# Patient Record
Sex: MALE | URBAN - METROPOLITAN AREA
[De-identification: names, ages, dates, MRNs, and addresses within clinical notes are randomized per-mention and may not be internally consistent; named-entity substitution may affect disease eponyms.]

---

## 2017-10-30 ENCOUNTER — TRANSFERRED RECORDS (OUTPATIENT)
Dept: HEALTH INFORMATION MANAGEMENT | Facility: CLINIC | Age: 57
End: 2017-10-30

## 2017-10-31 ENCOUNTER — HOSPITAL ENCOUNTER (OUTPATIENT)
Facility: CLINIC | Age: 57
Setting detail: OBSERVATION
Discharge: HOME | End: 2017-11-01
Attending: PHYSICIAN ASSISTANT | Admitting: INTERNAL MEDICINE
Payer: COMMERCIAL

## 2017-10-31 DIAGNOSIS — K80.00 CALCULUS OF GALLBLADDER WITH ACUTE CHOLECYSTITIS WITHOUT OBSTRUCTION: ICD-10-CM

## 2017-10-31 DIAGNOSIS — E87.6 HYPOKALEMIA: ICD-10-CM

## 2017-10-31 DIAGNOSIS — R03.0 ELEVATED BLOOD PRESSURE READING: ICD-10-CM

## 2017-10-31 DIAGNOSIS — R94.31 PROLONGED Q-T INTERVAL ON ECG: ICD-10-CM

## 2017-10-31 PROBLEM — K80.20 GALL STONE: Status: ACTIVE | Noted: 2017-10-31

## 2017-10-31 LAB
ALBUMIN SERPL-MCNC: 3.5 G/DL (ref 3.4–5)
ALP SERPL-CCNC: 63 U/L (ref 40–150)
ALT SERPL W P-5'-P-CCNC: 67 U/L (ref 0–70)
ANION GAP SERPL CALCULATED.3IONS-SCNC: 7 MMOL/L (ref 3–14)
AST SERPL W P-5'-P-CCNC: 62 U/L (ref 0–45)
BASOPHILS # BLD AUTO: 0 10E9/L (ref 0–0.2)
BASOPHILS NFR BLD AUTO: 0.5 %
BILIRUB SERPL-MCNC: 0.7 MG/DL (ref 0.2–1.3)
BUN SERPL-MCNC: 6 MG/DL (ref 7–30)
CALCIUM SERPL-MCNC: 9 MG/DL (ref 8.5–10.1)
CHLORIDE SERPL-SCNC: 99 MMOL/L (ref 94–109)
CO2 SERPL-SCNC: 33 MMOL/L (ref 20–32)
CREAT SERPL-MCNC: 1.13 MG/DL (ref 0.66–1.25)
DIFFERENTIAL METHOD BLD: ABNORMAL
EOSINOPHIL # BLD AUTO: 0 10E9/L (ref 0–0.7)
EOSINOPHIL NFR BLD AUTO: 0.5 %
ERYTHROCYTE [DISTWIDTH] IN BLOOD BY AUTOMATED COUNT: 12.8 % (ref 10–15)
GFR SERPL CREATININE-BSD FRML MDRD: 67 ML/MIN/1.7M2
GLUCOSE SERPL-MCNC: 99 MG/DL (ref 70–99)
HCT VFR BLD AUTO: 39.5 % (ref 40–53)
HGB BLD-MCNC: 13.7 G/DL (ref 13.3–17.7)
IMM GRANULOCYTES # BLD: 0 10E9/L (ref 0–0.4)
IMM GRANULOCYTES NFR BLD: 0.2 %
INR PPP: 1.06 (ref 0.86–1.14)
INTERPRETATION ECG - MUSE: NORMAL
LIPASE SERPL-CCNC: 305 U/L (ref 73–393)
LYMPHOCYTES # BLD AUTO: 1.7 10E9/L (ref 0.8–5.3)
LYMPHOCYTES NFR BLD AUTO: 19.6 %
MAGNESIUM SERPL-MCNC: 1.9 MG/DL (ref 1.6–2.3)
MCH RBC QN AUTO: 29.7 PG (ref 26.5–33)
MCHC RBC AUTO-ENTMCNC: 34.7 G/DL (ref 31.5–36.5)
MCV RBC AUTO: 86 FL (ref 78–100)
MONOCYTES # BLD AUTO: 1.2 10E9/L (ref 0–1.3)
MONOCYTES NFR BLD AUTO: 13.6 %
NEUTROPHILS # BLD AUTO: 5.6 10E9/L (ref 1.6–8.3)
NEUTROPHILS NFR BLD AUTO: 65.6 %
NRBC # BLD AUTO: 0 10*3/UL
NRBC BLD AUTO-RTO: 0 /100
PLATELET # BLD AUTO: 172 10E9/L (ref 150–450)
POTASSIUM SERPL-SCNC: 3 MMOL/L (ref 3.4–5.3)
POTASSIUM SERPL-SCNC: 3.1 MMOL/L (ref 3.4–5.3)
PROT SERPL-MCNC: 7.2 G/DL (ref 6.8–8.8)
RBC # BLD AUTO: 4.62 10E12/L (ref 4.4–5.9)
SODIUM SERPL-SCNC: 139 MMOL/L (ref 133–144)
TROPONIN I SERPL-MCNC: <0.015 UG/L (ref 0–0.04)
WBC # BLD AUTO: 8.5 10E9/L (ref 4–11)

## 2017-10-31 PROCEDURE — 93005 ELECTROCARDIOGRAM TRACING: CPT

## 2017-10-31 PROCEDURE — 83735 ASSAY OF MAGNESIUM: CPT | Performed by: PHYSICIAN ASSISTANT

## 2017-10-31 PROCEDURE — 36415 COLL VENOUS BLD VENIPUNCTURE: CPT | Performed by: INTERNAL MEDICINE

## 2017-10-31 PROCEDURE — 96365 THER/PROPH/DIAG IV INF INIT: CPT

## 2017-10-31 PROCEDURE — 25800025 ZZH RX 258: Performed by: INTERNAL MEDICINE

## 2017-10-31 PROCEDURE — 84132 ASSAY OF SERUM POTASSIUM: CPT | Performed by: INTERNAL MEDICINE

## 2017-10-31 PROCEDURE — 85025 COMPLETE CBC W/AUTO DIFF WBC: CPT | Performed by: PHYSICIAN ASSISTANT

## 2017-10-31 PROCEDURE — 80053 COMPREHEN METABOLIC PANEL: CPT | Performed by: PHYSICIAN ASSISTANT

## 2017-10-31 PROCEDURE — 83690 ASSAY OF LIPASE: CPT | Performed by: PHYSICIAN ASSISTANT

## 2017-10-31 PROCEDURE — 25000128 H RX IP 250 OP 636: Performed by: PHYSICIAN ASSISTANT

## 2017-10-31 PROCEDURE — 84484 ASSAY OF TROPONIN QUANT: CPT | Performed by: PHYSICIAN ASSISTANT

## 2017-10-31 PROCEDURE — 99207 ZZC CDG-MDM COMPONENT: MEETS MODERATE - UP CODED: CPT | Performed by: INTERNAL MEDICINE

## 2017-10-31 PROCEDURE — 25000132 ZZH RX MED GY IP 250 OP 250 PS 637: Performed by: INTERNAL MEDICINE

## 2017-10-31 PROCEDURE — G0378 HOSPITAL OBSERVATION PER HR: HCPCS

## 2017-10-31 PROCEDURE — 96366 THER/PROPH/DIAG IV INF ADDON: CPT

## 2017-10-31 PROCEDURE — 99285 EMERGENCY DEPT VISIT HI MDM: CPT | Mod: 25

## 2017-10-31 PROCEDURE — 85610 PROTHROMBIN TIME: CPT | Performed by: PHYSICIAN ASSISTANT

## 2017-10-31 PROCEDURE — 96367 TX/PROPH/DG ADDL SEQ IV INF: CPT

## 2017-10-31 PROCEDURE — 99220 ZZC INITIAL OBSERVATION CARE,LEVL III: CPT | Performed by: INTERNAL MEDICINE

## 2017-10-31 RX ORDER — POTASSIUM CHLORIDE 1500 MG/1
20-40 TABLET, EXTENDED RELEASE ORAL
Status: DISCONTINUED | OUTPATIENT
Start: 2017-10-31 | End: 2017-11-01 | Stop reason: HOSPADM

## 2017-10-31 RX ORDER — MORPHINE SULFATE 2 MG/ML
2-4 INJECTION, SOLUTION INTRAMUSCULAR; INTRAVENOUS EVERY 4 HOURS PRN
Status: DISCONTINUED | OUTPATIENT
Start: 2017-10-31 | End: 2017-11-01 | Stop reason: HOSPADM

## 2017-10-31 RX ORDER — POTASSIUM CL/LIDO/0.9 % NACL 10MEQ/0.1L
10 INTRAVENOUS SOLUTION, PIGGYBACK (ML) INTRAVENOUS
Status: DISCONTINUED | OUTPATIENT
Start: 2017-10-31 | End: 2017-11-01 | Stop reason: HOSPADM

## 2017-10-31 RX ORDER — POTASSIUM CHLORIDE 29.8 MG/ML
20 INJECTION INTRAVENOUS
Status: DISCONTINUED | OUTPATIENT
Start: 2017-10-31 | End: 2017-11-01 | Stop reason: HOSPADM

## 2017-10-31 RX ORDER — ACETAMINOPHEN 650 MG/1
650 SUPPOSITORY RECTAL EVERY 4 HOURS PRN
Status: DISCONTINUED | OUTPATIENT
Start: 2017-10-31 | End: 2017-11-01 | Stop reason: HOSPADM

## 2017-10-31 RX ORDER — ACETAMINOPHEN 325 MG/1
650 TABLET ORAL EVERY 4 HOURS PRN
Status: DISCONTINUED | OUTPATIENT
Start: 2017-10-31 | End: 2017-11-01 | Stop reason: HOSPADM

## 2017-10-31 RX ORDER — NALOXONE HYDROCHLORIDE 0.4 MG/ML
.1-.4 INJECTION, SOLUTION INTRAMUSCULAR; INTRAVENOUS; SUBCUTANEOUS
Status: DISCONTINUED | OUTPATIENT
Start: 2017-10-31 | End: 2017-11-01 | Stop reason: HOSPADM

## 2017-10-31 RX ORDER — LIDOCAINE 40 MG/G
CREAM TOPICAL
Status: DISCONTINUED | OUTPATIENT
Start: 2017-10-31 | End: 2017-11-01 | Stop reason: HOSPADM

## 2017-10-31 RX ORDER — OXYCODONE HYDROCHLORIDE 5 MG/1
5-10 TABLET ORAL
Status: DISCONTINUED | OUTPATIENT
Start: 2017-10-31 | End: 2017-11-01 | Stop reason: HOSPADM

## 2017-10-31 RX ORDER — NITROGLYCERIN 0.4 MG/1
0.4 TABLET SUBLINGUAL EVERY 5 MIN PRN
Status: DISCONTINUED | OUTPATIENT
Start: 2017-10-31 | End: 2017-11-01 | Stop reason: HOSPADM

## 2017-10-31 RX ORDER — AMOXICILLIN 250 MG
1 CAPSULE ORAL 2 TIMES DAILY PRN
Status: DISCONTINUED | OUTPATIENT
Start: 2017-10-31 | End: 2017-11-01 | Stop reason: HOSPADM

## 2017-10-31 RX ORDER — POTASSIUM CHLORIDE 7.45 MG/ML
10 INJECTION INTRAVENOUS
Status: DISCONTINUED | OUTPATIENT
Start: 2017-10-31 | End: 2017-11-01 | Stop reason: HOSPADM

## 2017-10-31 RX ORDER — POTASSIUM CHLORIDE 1.5 G/1.58G
20-40 POWDER, FOR SOLUTION ORAL
Status: DISCONTINUED | OUTPATIENT
Start: 2017-10-31 | End: 2017-11-01 | Stop reason: HOSPADM

## 2017-10-31 RX ORDER — ONDANSETRON 2 MG/ML
4 INJECTION INTRAMUSCULAR; INTRAVENOUS EVERY 6 HOURS PRN
Status: DISCONTINUED | OUTPATIENT
Start: 2017-10-31 | End: 2017-11-01 | Stop reason: HOSPADM

## 2017-10-31 RX ORDER — POTASSIUM CL/LIDO/0.9 % NACL 10MEQ/0.1L
10 INTRAVENOUS SOLUTION, PIGGYBACK (ML) INTRAVENOUS ONCE
Status: COMPLETED | OUTPATIENT
Start: 2017-10-31 | End: 2017-10-31

## 2017-10-31 RX ORDER — AMOXICILLIN 250 MG
2 CAPSULE ORAL 2 TIMES DAILY PRN
Status: DISCONTINUED | OUTPATIENT
Start: 2017-10-31 | End: 2017-11-01 | Stop reason: HOSPADM

## 2017-10-31 RX ORDER — ONDANSETRON 4 MG/1
4 TABLET, ORALLY DISINTEGRATING ORAL EVERY 6 HOURS PRN
Status: DISCONTINUED | OUTPATIENT
Start: 2017-10-31 | End: 2017-11-01 | Stop reason: HOSPADM

## 2017-10-31 RX ORDER — DEXTROSE MONOHYDRATE, SODIUM CHLORIDE, AND POTASSIUM CHLORIDE 50; 1.49; 9 G/1000ML; G/1000ML; G/1000ML
INJECTION, SOLUTION INTRAVENOUS CONTINUOUS
Status: DISCONTINUED | OUTPATIENT
Start: 2017-10-31 | End: 2017-11-01 | Stop reason: HOSPADM

## 2017-10-31 RX ORDER — BISACODYL 10 MG
10 SUPPOSITORY, RECTAL RECTAL DAILY PRN
Status: DISCONTINUED | OUTPATIENT
Start: 2017-10-31 | End: 2017-11-01 | Stop reason: HOSPADM

## 2017-10-31 RX ADMIN — ACETAMINOPHEN 650 MG: 325 TABLET, FILM COATED ORAL at 19:57

## 2017-10-31 RX ADMIN — POTASSIUM CHLORIDE, DEXTROSE MONOHYDRATE AND SODIUM CHLORIDE: 150; 5; 900 INJECTION, SOLUTION INTRAVENOUS at 16:37

## 2017-10-31 RX ADMIN — TAZOBACTAM SODIUM AND PIPERACILLIN SODIUM 3.38 G: 375; 3 INJECTION, SOLUTION INTRAVENOUS at 14:33

## 2017-10-31 RX ADMIN — SODIUM CHLORIDE 1000 ML: 9 INJECTION, SOLUTION INTRAVENOUS at 14:34

## 2017-10-31 RX ADMIN — Medication 10 MEQ: at 12:47

## 2017-10-31 ASSESSMENT — ENCOUNTER SYMPTOMS
NAUSEA: 1
FEVER: 0
ABDOMINAL PAIN: 1
VOMITING: 1
DIAPHORESIS: 0
CHILLS: 0
DYSURIA: 0
HEMATURIA: 0

## 2017-10-31 ASSESSMENT — PAIN DESCRIPTION - DESCRIPTORS: DESCRIPTORS: CRAMPING

## 2017-10-31 NOTE — PHARMACY-ADMISSION MEDICATION HISTORY
Admission medication history interview status for this patient is complete. See [facility] admission navigator for allergy information, prior to admission medications and immunization status.     Prior to Admission medications    None

## 2017-10-31 NOTE — ED NOTES
[facility]  ED Nurse Handoff Report    Yoel Castnao is a 56 year old male   ED Chief complaint: Abnormal Labs  . ED Diagnosis:   Final diagnoses:   Calculus of gallbladder with acute cholecystitis without obstruction   Hypokalemia   Prolonged Q-T interval on ECG     Allergies: No Known Allergies    Code Status: DNR / DNI  Activity level - Baseline/Home:  Independent. Activity Level - Current:   Independent. Lift room needed: No. Bariatric: No   Needed: No   Isolation: No. Infection: Not Applicable.     Vital Signs:   Vitals:    10/31/17 1130 10/31/17 1345 10/31/17 1400 10/31/17 1415   BP:  (!) 130/97 (!) 135/99 (!) 141/114   Temp:       TempSrc:       SpO2: 99%          Cardiac Rhythm:  ,      Pain level:    Patient confused: No. Patient Falls Risk: Yes.   Elimination Status: Has voided   Patient Report - Initial Complaint:potassium of 2.8 check for an endoscopy scheduled today for a gallbladder concern. The patient notes he has a scheduled endoscopy for intense epigastric pain that began last Sunday which occurred after a self-imposed 3 weeks fast that began on 10/7/17. The patient reports he only drinks water during his fast, and he does the fast once every year. The patient notes he vomited twice a couple of days ago. The patient reports he also lost 40lbs in the last 3 months after changing his diet due to an unhealthy diet before. The patient denies any fevers, chills, diaphoresis, dysuria, or hematuria. Focused Assessment: upper abd tenderness upon palpation. No n/v/d, CP or SOB.   Tests Performed: labs, (US performed yesterday at [facility]) . Abnormal Results: K 3.0, US from yesterday showed inflammed gallbladder.   Treatments provided: Zosyn and K IV, IVF  Family Comments: wife at home   OBS brochure/video discussed/provided to patient:  Yes  ED Medications:   Medications   0.9% sodium chloride BOLUS (1,000 mLs Intravenous New Bag 10/31/17 0414)   piperacillin-tazobactam (ZOSYN)  infusion 3.375 g (3.375 g Intravenous New Bag 10/31/17 1433)   potassium chloride 10 mEq in 100 mL intermittent infusion with 10 mg lidocaine (0 mEq Intravenous Stopped 10/31/17 1348)     Drips infusing:  Yes  For the majority of the shift, the patient's behavior Green. Interventions performed were n/a.     Severe Sepsis OR Septic Shock Diagnosis Present: No      ED Nurse Name/Phone Number: Brijesh Lamar,   2:42 PM    RECEIVING UNIT ED HANDOFF REVIEW    Above ED Nurse Handoff Report was reviewed: Yes  Reviewed by: Yaz Cheema on October 31, 2017 at 2:57 PM

## 2017-10-31 NOTE — ED PROVIDER NOTES
History     Chief Complaint:  Abnormal Labs    HPI   Yoel Castano is a 56 year old male who presents to the ED for evaluation of abnormal labs. The patient reports he is sent to the ED after a potassium of 2.8 check for an endoscopy scheduled today for a gallbladder concern. The patient notes he has a scheduled endoscopy for intense epigastric pain that began last Sunday which occurred after a self-imposed 3 weeks fast that began on 10/7/17. The patient reports he only drinks water during his fast, and he does the fast once every year. The patient notes he vomited twice a couple of days ago. The patient reports he also lost 40lbs in the last 3 months after changing his diet due to an unhealthy diet before. The patient denies any fevers, chills, diaphoresis, dysuria, or hematuria.       US Abdomen Complete, 10/30/2017, [facility]  Findings: gallbladder: abnormal, with luminal distention, wall thickening at the fundus for the degree of distention, and stone and sludge.   Conclusion: Cholelithiasis with sonographic findings fairly suspicious for acute cholecystitis.   Tragn Abdalla MD    Laboratory, 10/30/2017, [facility]  CBC: o/w WNL (WBC 7.7, HGB 14.0, )   CMP: Potassium 2.80(LL), GFR estimate 52(L), Creatinine 1.47(H)    Allergies:  No known drug allergies    Medications:    The patient is currently on no regular medications.    Past Medical History:    The patient does not have any past pertinent medical history.    Past Surgical History:    History reviewed. No pertinent surgical history.    Family History:    History reviewed. No pertinent family history.     Social History:  Smoking status: Former smoker  Alcohol use: Very rare  Presents to ED alone    Review of Systems   Constitutional: Negative for chills, diaphoresis and fever.   Gastrointestinal: Positive for abdominal pain, nausea and vomiting.   Genitourinary: Negative for dysuria and hematuria.   All other systems reviewed  "and are negative.    Physical Exam     Patient Vitals for the past 24 hrs:   BP Temp Temp src Heart Rate Resp SpO2 Height Weight   10/31/17 1520 (!) 137/94 97.7  F (36.5  C) Oral 77 16 98 % 1.803 m (5' 11\") 80.2 kg (176 lb 14.4 oz)   10/31/17 1430 (!) 139/101 - - 90 - - - -   10/31/17 1415 (!) 141/114 - - 77 - - - -   10/31/17 1400 (!) 135/99 - - 79 - - - -   10/31/17 1345 (!) 130/97 - - 79 - - - -   10/31/17 1124 (!) 131/94 97.4  F (36.3  C) Tympanic - - - - -   10/31/17 1122 - - - 97 - 98 % - -     Physical Exam  Nursing note and vitals reviewed.     GENERAL: Alert, mild distress, non toxic appearing.   HEENT: Normal conjunctiva. No scleral icterus. MMM.   NECK: Supple.  CARDIAC: Normal rate and regular rhythm. Normal heart sounds. No murmurs, rubs, or gallops appreciated.  PULMONARY: CTA bilaterally. Normal breath sounds. No wheezing, crackles, or rhonchi appreciated.  ABDOMEN: Reproducible tenderness to light palpation over the right upper quadrant and epigastric regions. Soft, non distended abdomen. RUQ and epigastric tenderness to light palpation. No rebound or guarding.   NEURO: Alert and oriented. Non-focal.   MUSCULOSKELETAL: Normal range of motion. No peripheral edema. No CVA tenderness.   SKIN: Skin is warm and dry. No rashes. No pallor or jaundice.   PSYCH: Normal affect and mood.      Emergency Department Course     ECG (11:25:29):  Rate 85 bpm. RI interval 144. QRS duration 82. QT/QTc 394/468. P-R-T axes 9 4 31. Normal sinus rhythm. Nonspecific T wave abnormality. Prolonged QT. Abnormal ECG. Interpreted at 1127.     Laboratory:  Lipase: 305  Magnesium: 1.9  Troponin I: <0.015  CBC: o/w WNL (WBC 8.5, HGB 13.7, )   CMP: Potassium 3.0(L), Carbon dioxide 33(H), BUN 6(L), AST 62(H), o/w WNL (Creatinine 1.13)    Interventions:  1247: Potassium chloride 10mEq IV  1433: Zosyn 3.375g IV  1434: NS 1L Bolus IV    Emergency Department Course:  Past medical records, nursing notes, and vitals reviewed.  1131: " I performed an exam of the patient and obtained history, as documented above.  IV inserted and blood drawn.    1250: I rechecked the patient. Explained findings to patient.    1308: I spoke with the patient's wife.     1337: I rechecked the patient.    1418: I spoke to Dr. Little of the hospitalist service who accepts the patient for admission.     1422: I rechecked the patient.     Findings and plan explained to the Patient who consents to admission. Discussed the patient with Dr. Little, who will admit the patient to an obs bed for further monitoring, evaluation, and treatment.     Impression & Plan      Medical Decision Making:  This is a 56 year old male who presents with concern for upper abdominal pain in addition to low potassium. Over the past 3 days, he has had upper abdominal discomfort following a 3 week fasting period where he was only drinking water. He was seen at [facility] yesterday and had an ultrasound showing cholelithiasis with signs concerning for possible cholecystitis including gallbladder wall thickening. He also had labs drawn and was noted to have a potassium of 2.8, thus was advised to present to the ED for further evaluation. Here he is afebrile, slightly hypertensive, but otherwise hemodynamically stable and non-toxic appearing. He does have reproducible tenderness to light palpation over the right upper quadrant and epigastric regions. White count within normal limits, though he does have some elevation in AST. Given his discomfort and ultrasound findings, I am concerned for acute cholecystitis. I discussed this with him, though he is not interested in having his gallbladder removed. I discussed that if this truly is cholecystitis this can lead to systemic infection and possible life threatening complications. He voiced an understanding of this, though would prefer to monitor his pain overnight and if worsens, then will consider cholecystectomy.     In addition, potassium here is slightly  decreased at 3.0. Magnesium within normal limits. This was replaced with 10mEq IV. EKG does show slightly prolonged QT at 468. He notes he may have had this in the past. No other acute electrolyte abnormalities. No chest pain or shortness of breath. Given this ECG finding in conjunction with his abdominal pain, I did feel admission for observation including cardiac monitoring was indicated. After a lengthy discussion, patient was in agreement with plan. I discussed with the patient with Dr. Little, hospitalist service, who accepts the patient for admission to observation unit. He remained slightly hypertensive in ED but otherwise hemodynamically stable. No signs of hypertensive emergency or urgency.     Diagnosis:    ICD-10-CM   1. Calculus of gallbladder with acute cholecystitis without obstruction K80.00   2. Hypokalemia E87.6   3. Prolonged Q-T interval on ECG R94.31   4. Elevated blood pressure reading R03.0     Disposition: Patient admitted to an obs bed by Dr. Anabel Bond  10/31/2017   [facility] EMERGENCY DEPARTMENT    I, Elisabeth Bond, am serving as a scribe at 11:31 AM on 10/31/2017 to document services personally performed by Velia Larsen PA-C based on my observations and the provider's statements to me.        Velia Larsen PA-C  10/31/17 3655

## 2017-10-31 NOTE — IP AVS SNAPSHOT
MRN:[id number]                      After Visit Summary   10/31/2017    Yoel Castano    MRN: [id number]           Thank you!     Thank you for choosing [facility] for your care. Our goal is always to provide you with excellent care. Hearing back from our patients is one way we can continue to improve our services. Please take a few minutes to complete the written survey that you may receive in the mail after you visit. If you would like to speak to someone directly about your visit please contact Patient Relations at [phone]. Thank you!          Patient Information     Date Of Birth          1960        About your hospital stay     You were admitted on:  October 31, 2017 You last received care in the:  [facility] Observation Department    You were discharged on:  November 1, 2017        Reason for your hospital stay       Epigastric abdominal pain and mild nausea and vomiting. Pain has resolved and you tolerated a regular diet. Potassium was low on admission, this was replaced and now within normal limits. You have not had a BM for several days and are beginning to feel bloated. Recommend full liquid diet until bowels start to move. If no movement in 1-2 days, recommend Miralax.                  Who to Call     For medical emergencies, please call 911.  For non-urgent questions about your medical care, please call your primary care provider or clinic, None          Attending Provider     Provider Specialty    Velia Larsen PA-C Physician Assistant    Villa Little MD Internal Medicine       Primary Care Provider    Physician No Ref-Primary       When to contact your care team       Call your primary doctor if you have any of the following: temperature greater than 101, increased abdominal pain, persistent nausea and/or vomiting, or no bowel movement after 2 days.                  After Care Instructions     Activity       Your activity  "upon discharge: activity as tolerated            Diet       Follow this diet upon discharge: Full liquid diet, then advance to regular as tolerated                  Follow-up Appointments     Follow-up and recommended labs and tests        Follow up with primary care provider, Physician No Ref-Primary, within 7 days for hospital follow- up.  No follow up labs or test are needed.  Follow up with general surgery if epigastric pain reoccurs or becomes persistent.   Recommend full liquid diet until bowels begin to move. If no bowel movement in the next 1-2 days, consider Miralax.                             Pending Results     No orders found for last 3 day(s).            Statement of Approval     Ordered          11/01/17 1025  I have reviewed and agree with all the recommendations and orders detailed in this document.  EFFECTIVE NOW     Approved and electronically signed by:  Reina Ruiz PA-C             Admission Information     Date & Time Provider Department Dept. Phone    10/31/2017 Villa Little MD [facility] Observation Department [phone]      Your Vitals Were     Blood Pressure Pulse Temperature Respirations Height Weight    119/84 (BP Location: Left arm) 71 99.8  F (37.7  C) (Oral) 16 1.803 m (5' 11\") 80.2 kg (176 lb 14.4 oz)    Pulse Oximetry BMI (Body Mass Index)                98% 24.67 kg/m2          [employer] Information     [facility] [facility] lets you send messages to your doctor, view your test results, renew your prescriptions, schedule appointments and more. To sign up, go to [email or web address] . Click on \"Log in\" on the left side of the screen, which will take you to the Welcome page. Then click on \"Sign up Now\" on the right side of the page.     You will be asked to enter the access code listed below, as well as some personal information. Please follow the directions to create your username and password.     Your access code is: [email or web address]  Expires: 1/30/2018 10:34 AM   "   Your access code will  in 90 days. If you need help or a new code, please call your [facility] clinic or [phone].        Care EveryWhere ID     This is your Care EveryWhere ID. This could be used by other organizations to access your [facility] medical records  [id number]        Equal Access to Services     NIEVESML CHAD : Hadii aad ku hadasho Soomaali, waaxda luqadaha, qaybta kaalmada adeegyada, waxerica troy dneisen stephanieodminik rivas laEliudharrison ibrahim. So [facility] [phone].    ATENCIÓN: Si habla español, tiene a monreal disposición servicios gratuitos de asistencia lingüística. Llame al [phone].    We comply with applicable federal civil rights laws and [place] laws. We do not discriminate on the basis of race, color, national origin, age, disability, sex, sexual orientation, or gender identity.               Review of your medicines      Notice     You have not been prescribed any medications.             Protect others around you: Learn how to safely use, store and throw away your medicines at www.disposemymeds.org.             Medication List: This is a list of all your medications and when to take them. Check marks below indicate your daily home schedule. Keep this list as a reference.      Notice     You have not been prescribed any medications.              More Information        Full Liquid Diet  A full liquid diet is a middle step between a clear liquid diet and eating solid foods. A clear liquid diet allows only liquids you can see through. A full liquid diet allows thicker, liquid foods as listed below. It can be anything that is liquid at room temperature.  The full liquid diet may be used before or after surgery. It is also used before some medical tests. It is easy to digest and leaves little food in the stomach and intestines.  A full liquid diet meets calorie and protein needs for your body with liquids only. It should not be used for more than 5 days. Your healthcare provider or dietitian may also  order high-protein, high-calorie liquid supplements. These will give you extra vitamins and minerals. You may include the items below on a full liquid diet.    Adults  Adults should drink a total of 2 to 3 quarts of liquid per day. It may be easier to drink small, frequent servings rather than a few large ones. People with severe kidney or heart disease can drink only limited amounts of fluids. Check with your provider.    Cereals and soups. Creamy, hot breakfast cereals (wheat or rice), pureed soups (including pureed meats, bland vegetables, and white potatoes), tomato puree.    Desserts. Gelatin, whipped topping, custard-style yogurt, pudding, custard, plain ice cream, sherbet, sorbet, popsicles, fruit juice bars.    Drinks. Coffee, tea, cream, milk, milkshakes, fruit and vegetable juices, sodas, mineral water (plain or flavored), liquid gelatin, electrolyte replacement sport drinks.    Other items. Salt, mild-flavored seasonings, chocolate flavoring, gravy, margarine, sugar, syrup, jelly, honey, hard candy (to suck on).  Children  Follow the healthcare provider s instructions. Young children who are eating solid foods may be able to have the items listed above without problems. Be sure to follow these safety measures:    Don't give hard candies to young children. The candies may cause choking.    Children under 1 year old. Do not give honey. It may cause illness.    Children under 2 years old. Ask your child s provider if you should supplement your child s diet with oral rehydration solutions, which have electrolytes. You can buy these drinks at pharmacies and grocery stores. You don t need a prescription.    Children over 1 year old. Limit milk to 3 or 4 cups per day. Too much milk can make your child less hungry for other foods.  Date Last Reviewed: 8/1/2016 2000-2017 [employer]. [address] [place] [address], [place] [address]. All rights reserved. This information is not intended as a substitute  for professional medical care. Always follow your healthcare professional's instructions.                Discharge Instructions: Eating a Full Liquid Diet  Your healthcare provider prescribed a full liquid diet temporarily for you. You may have trouble swallowing solid foods. Or, you may have had surgery and not be ready for solid food or need to advance to solid foods gradually. Here's what you need to know about this type of diet.  Home care    Remember, this diet is temporary. You should not follow this diet longer than directed because it doesn t provide you with enough energy or protein.    Contact your healthcare provider if you are on this diet for more than 5 days. You may need nutritional or vitamin supplements.    Keep track of the amount of liquid that you drink and anything you eat while on this diet. Keep a log for your healthcare provider.  Choose these foods    Choose fruit juices without pulp, such as apple juice, grape juice, cranberry juice, and nectars.    Choose drinks such as coffee, tea (hot or cold), fruit flavored drinks, soda, water, milk (whole, skim, 1%, and 2%), cream, instant breakfast drinks, and liquid meal replacements.    Choose desserts and snacks such as fruit ices (without chunks of fruit), plain or vanilla yogurt, plain gelatin, hard candy, Popsicles made from juices, custards, frozen yogurt, smoothies without chunks, ice cream (without nuts or candy), and pudding.    Choose broth, bouillon, fat-free consommé, or strained cream soups.    Choose thin, refined hot cereals, such as porridge, and grits.  Don't eat these foods    Don t eat canned, fresh, or frozen fruit.    Don t eat soup with vegetables, noodles, rice, meat, or other chunks of food in it.    Don t eat vegetables, bread, whole cereal and grain products, meat, chicken, fish, meat substitutes (nuts, tofu, etc.), oils, butter, or margarine.  Follow-up  Make a follow-up appointment, or as advised.     When to call your  healthcare provider  Call your healthcare provider right away if you have any of the following:    Fever of 100.4 F (38.0 C) or higher    Diarrhea that lasts for more than 1 day    Vomiting that does not stop    Trouble urinating    Trouble passing gas    Abdominal pain with bloating and cramping   Date Last Reviewed: 6/1/2017 2000-2017 [employer]. [address] [place], [place] [address]. All rights reserved. This information is not intended as a substitute for professional medical care. Always follow your healthcare professional's instructions.

## 2017-10-31 NOTE — H&P
[facility]  History and Physical   Hospitalist Service    Villa Little MD    Yoel Castano MRN# [id number]   YOB: 1960 Age: 56 year old      Date of Admission:  10/31/2017           Assessment and Plan:   Yoel Castano is a 56 year old male without chronic medical problems.  He presented to the emergency department on 10/31/17 for evaluation of abdominal pain and abnormal labs.  Approximately 6 or 8 weeks ago the patient started eating a low carbohydrate diet for weight loss.  His weight had gotten up to 205 pounds prompting this diet change.  Over the next 3 or 4 weeks he was successful in losing weight.  Starting about 3 weeks ago he began to fast. He fasted for 3 weeks except for drinking water.  He has done prolonged fasts like this maybe 6 or 10 times in his life.  Previously, he had fasted as an exercise in mental fortitude.  This time, however, he fasted to extend his weight loss.  He did lose approximately 35 pounds in total- between his low carbohydrate diet and three-week fast.  He started eating again approximately 2 or 3 days ago.  Upon eating, he developed epigastric abdominal pain.  He had a couple of episodes of slight emesis.  The pain worsened.  He was seen by a physician at [facility] and had abdominal ultrasound on 10/30 (yesterday).  This showed a gallstone, biliary sludge, and findings suspicious for possible acute cholecystitis (thickening of the fundus of the gallbladder).  Laboratory evaluation showed potassium of 2.8.  He was referred to the emergency department for further evaluation.  Of note, his epigastric pain is quite a bit better today.  Emergency department evaluation showed stable vital signs and unremarkable labs except for potassium of 3.  AST was also slightly elevated at 62.  QT was prolonged at 394 ms with corrected QT of 468 ms.  I was asked to admit Yoel to the observation for telemetry monitoring, potassium replacement, and  further evaluation and treatment of possible acute cholecystitis.  Of note, Yoel did not want to see a surgeon right away. Since he was feeling better, he was not convinced that he had acute cholecystitis.    Problem list:    1.  Hypokalemia. I suspect that this is due to recent, prolonged fasting with only free water intake.  It may have been exacerbated by mild emesis.  Patient will be watched on telemetry.  Potassium will be replaced.  Basic metabolic panel will be repeated tomorrow.    2.  Prolonged QT.  Monitor on telemetry.  Repeat EKG tomorrow after potassium replacement.  He thinks that he may have had mild prolonged QT in the past on an EKG obtained for his  flight physical.    3.  Possible biliary disease.  He does have a gallstone by ultrasound.  Biliary sludge is also noted.  There is suggestion of possible acute cholecystitis although this impression is not overwhelming.  He is improving. I suspect that his pain and biliary sludge could be related to cholestasis from recent fasting.  I think it is reasonable to feed him and see how he does.  If he has no further (or improving) pain then further evaluation of this is likely unnecessary.  If eating makes the pain worse then I would consider surgery consult tomorrow. I am not providing empiric antibiotics at this time. I discussed this with surgery on who agreed.    Full code  Ambulate for DVT prophylaxis  Disposition: [place] to observation.           Code Status:   Full Code         Primary Care Physician:   No Ref-Primary, Physician None         Chief Complaint:   Abdominal pain, low potassium    History is obtained from Velia Diallo PA-C, and the medical record.           History of Present Illness:   Yoel Castano is a 56 year old male without chronic medical problems.  He presented to the emergency department on 10/31/17 for evaluation of abdominal pain and abnormal labs.  Approximately 6 or 8 weeks ago the patient started  eating a low carbohydrate diet for weight loss.  His weight had gotten up to 205 pounds prompting this diet change.  Over the next 3 or 4 weeks he was successful in losing weight.  Starting about 3 weeks ago he began to fast. He fasted for 3 weeks except for drinking water.  He has done prolonged fasts like this maybe 6 or 10 times in his life.  Previously, he had fasted as an exercise in mental fortitude.  This time, however, he fasted to extend his weight loss.  He did lose approximately 35 pounds in total- between his low carbohydrate diet and three-week fast.  He started eating again approximately 2 or 3 days ago.  Upon eating, he developed epigastric abdominal pain.  He had a couple of episodes of slight emesis.  The pain worsened.  He was seen by a physician at [facility] and had abdominal ultrasound on 10/30 (yesterday).  This showed a gallstone, biliary sludge, and findings suspicious for possible acute cholecystitis (thickening of the fundus of the gallbladder).  Laboratory evaluation showed potassium of 2.8.  He was referred to the emergency department for further evaluation.  Of note, his epigastric pain is quite a bit better today.  Emergency department evaluation showed stable vital signs and unremarkable labs except for potassium of 3.  AST was also slightly elevated at 62.  QT was prolonged at 394 ms with corrected QT of 468 ms.  I was asked to admit Yoel to the observation for telemetry monitoring, potassium replacement, and further evaluation and treatment of possible acute cholecystitis.  Of note, Yoel did not want to see a surgeon right away. Since he was feeling better, he was not convinced that he had acute cholecystitis.           Past Medical History:     Patient Active Problem List   Diagnosis     Hypokalemia     Gall stone      History reviewed. No pertinent past medical history.          Past Surgical History:     Past Surgical History:   Procedure Laterality Date     GENITOURINARY  "SURGERY              Home Medications:     None         Allergies:   No Known Allergies         Social History:     Social History   Substance Use Topics     Smoking status: Former Smoker     Smokeless tobacco: Not on file     Alcohol use No      Comment: very rare    to a nurse          Family History:   No known family history of gall stone disease.           Review of Systems:   The 10 point Review of Systems is negative other than as noted in the HPI.           Physical Exam:   Blood pressure (!) 137/94, temperature 97.7  F (36.5  C), temperature source Oral, resp. rate 16, height 1.803 m (5' 11\"), weight 80.2 kg (176 lb 14.4 oz), SpO2 98 %.  176 lbs 14.4 oz      GENERAL: Pleasant and cooperative. No acute distress.  EYES: Pupils equal and round. No scleral erythema or icterus.  ENT: External ears are normal without deformity. Posterior oropharynx is without erythem, swelling, or exudate.  NECK: Supple. No masses or swelling. No tenderness. Thyroid is normal without mass or tenderness.  CHEST: Clear to auscultation. Normal breath sounds. No retractions.   CV: Regular rate and rhythm. No JVD. Pulses normal.  ABDOMEN: Bowel sounds present. Minimal right upper quadrant and epigastric tenderness. No masses or hernia.  EXTREMETIES: No clubbing, cyanosis, or ischemia.  SKIN: Warm and dry to touch. No wounds or rashes.  NEUROLOGIC: Strength and sensation are normal. Deep tendon reflexes are normal. Cranial nerves are normal.             Data:   All new lab and imaging data was reviewed.     Results for orders placed or performed during the hospital encounter of 10/31/17 (from the past 24 hour(s))   EKG 12 lead   Result Value Ref Range    Interpretation ECG Click View Image link to view waveform and result    CBC + differential   Result Value Ref Range    WBC 8.5 4.0 - 11.0 10e9/L    RBC Count 4.62 4.4 - 5.9 10e12/L    Hemoglobin 13.7 13.3 - 17.7 g/dL    Hematocrit 39.5 (L) 40.0 - 53.0 %    MCV 86 78 - 100 fl    " MCH 29.7 26.5 - 33.0 pg    MCHC 34.7 31.5 - 36.5 g/dL    RDW 12.8 10.0 - 15.0 %    Platelet Count 172 150 - 450 10e9/L    Diff Method Automated Method     % Neutrophils 65.6 %    % Lymphocytes 19.6 %    % Monocytes 13.6 %    % Eosinophils 0.5 %    % Basophils 0.5 %    % Immature Granulocytes 0.2 %    Nucleated RBCs 0 0 /100    Absolute Neutrophil 5.6 1.6 - 8.3 10e9/L    Absolute Lymphocytes 1.7 0.8 - 5.3 10e9/L    Absolute Monocytes 1.2 0.0 - 1.3 10e9/L    Absolute Eosinophils 0.0 0.0 - 0.7 10e9/L    Absolute Basophils 0.0 0.0 - 0.2 10e9/L    Abs Immature Granulocytes 0.0 0 - 0.4 10e9/L    Absolute Nucleated RBC 0.0    Comprehensive metabolic panel   Result Value Ref Range    Sodium 139 133 - 144 mmol/L    Potassium 3.0 (L) 3.4 - 5.3 mmol/L    Chloride 99 94 - 109 mmol/L    Carbon Dioxide 33 (H) 20 - 32 mmol/L    Anion Gap 7 3 - 14 mmol/L    Glucose 99 70 - 99 mg/dL    Urea Nitrogen 6 (L) 7 - 30 mg/dL    Creatinine 1.13 0.66 - 1.25 mg/dL    GFR Estimate 67 >60 mL/min/1.7m2    GFR Estimate If Black 81 >60 mL/min/1.7m2    Calcium 9.0 8.5 - 10.1 mg/dL    Bilirubin Total 0.7 0.2 - 1.3 mg/dL    Albumin 3.5 3.4 - 5.0 g/dL    Protein Total 7.2 6.8 - 8.8 g/dL    Alkaline Phosphatase 63 40 - 150 U/L    ALT 67 0 - 70 U/L    AST 62 (H) 0 - 45 U/L   Lipase   Result Value Ref Range    Lipase 305 73 - 393 U/L   Magnesium   Result Value Ref Range    Magnesium 1.9 1.6 - 2.3 mg/dL   Troponin I   Result Value Ref Range    Troponin I ES <0.015 0.000 - 0.045 ug/L   INR   Result Value Ref Range    INR 1.06 0.86 - 1.14

## 2017-10-31 NOTE — PLAN OF CARE
Problem: Patient Care Overview  Goal: Discharge Needs Assessment  ROOM # 227     Living Situation (if not independent, order SW consult):  Facility name:  : Karla (wife)     Activity level at baseline: Independent             Activity level on admit: Independent        Patient registered to observation; given Patient Bill of Rights; given the opportunity to ask questions about observation status and their plan of care.  Patient has been oriented to the observation room, bathroom and call light is in place.     Discussed discharge goals and expectations with patient/family.

## 2017-10-31 NOTE — IP AVS SNAPSHOT
[facility] Observation Department    [address]    [facility] [address]    Phone:  [phone]                                       After Visit Summary   10/31/2017    Yoel Castano    MRN: [id number]           After Visit Summary Signature Page     I have received my discharge instructions, and my questions have been answered. I have discussed any challenges I see with this plan with the nurse or doctor.    ..........................................................................................................................................  Patient/Patient Representative Signature      ..........................................................................................................................................  Patient Representative Print Name and Relationship to Patient    ..................................................               ................................................  Date                                            Time    ..........................................................................................................................................  Reviewed by Signature/Title    ...................................................              ..............................................  Date                                                            Time

## 2017-11-01 VITALS
BODY MASS INDEX: 24.77 KG/M2 | TEMPERATURE: 99.8 F | WEIGHT: 176.9 LBS | DIASTOLIC BLOOD PRESSURE: 91 MMHG | RESPIRATION RATE: 18 BRPM | SYSTOLIC BLOOD PRESSURE: 126 MMHG | HEART RATE: 71 BPM | OXYGEN SATURATION: 100 % | HEIGHT: 71 IN

## 2017-11-01 LAB
ANION GAP SERPL CALCULATED.3IONS-SCNC: 5 MMOL/L (ref 3–14)
BUN SERPL-MCNC: 6 MG/DL (ref 7–30)
CALCIUM SERPL-MCNC: 8 MG/DL (ref 8.5–10.1)
CHLORIDE SERPL-SCNC: 105 MMOL/L (ref 94–109)
CO2 SERPL-SCNC: 30 MMOL/L (ref 20–32)
CREAT SERPL-MCNC: 0.97 MG/DL (ref 0.66–1.25)
ERYTHROCYTE [DISTWIDTH] IN BLOOD BY AUTOMATED COUNT: 13 % (ref 10–15)
GFR SERPL CREATININE-BSD FRML MDRD: 80 ML/MIN/1.7M2
GLUCOSE SERPL-MCNC: 100 MG/DL (ref 70–99)
HCT VFR BLD AUTO: 37.3 % (ref 40–53)
HGB BLD-MCNC: 12.1 G/DL (ref 13.3–17.7)
MCH RBC QN AUTO: 28.7 PG (ref 26.5–33)
MCHC RBC AUTO-ENTMCNC: 32.4 G/DL (ref 31.5–36.5)
MCV RBC AUTO: 89 FL (ref 78–100)
PLATELET # BLD AUTO: 168 10E9/L (ref 150–450)
POTASSIUM SERPL-SCNC: 3.6 MMOL/L (ref 3.4–5.3)
RBC # BLD AUTO: 4.21 10E12/L (ref 4.4–5.9)
SODIUM SERPL-SCNC: 140 MMOL/L (ref 133–144)
WBC # BLD AUTO: 7.5 10E9/L (ref 4–11)

## 2017-11-01 PROCEDURE — 25000132 ZZH RX MED GY IP 250 OP 250 PS 637: Performed by: INTERNAL MEDICINE

## 2017-11-01 PROCEDURE — 36415 COLL VENOUS BLD VENIPUNCTURE: CPT | Performed by: INTERNAL MEDICINE

## 2017-11-01 PROCEDURE — 25800025 ZZH RX 258: Performed by: INTERNAL MEDICINE

## 2017-11-01 PROCEDURE — G0378 HOSPITAL OBSERVATION PER HR: HCPCS

## 2017-11-01 PROCEDURE — 85027 COMPLETE CBC AUTOMATED: CPT | Performed by: INTERNAL MEDICINE

## 2017-11-01 PROCEDURE — 99217 ZZC OBSERVATION CARE DISCHARGE: CPT | Performed by: PHYSICIAN ASSISTANT

## 2017-11-01 PROCEDURE — 80048 BASIC METABOLIC PNL TOTAL CA: CPT | Performed by: INTERNAL MEDICINE

## 2017-11-01 RX ADMIN — POTASSIUM CHLORIDE 20 MEQ: 1500 TABLET, EXTENDED RELEASE ORAL at 02:35

## 2017-11-01 RX ADMIN — POTASSIUM CHLORIDE 40 MEQ: 1500 TABLET, EXTENDED RELEASE ORAL at 00:57

## 2017-11-01 RX ADMIN — POTASSIUM CHLORIDE, DEXTROSE MONOHYDRATE AND SODIUM CHLORIDE: 150; 5; 900 INJECTION, SOLUTION INTRAVENOUS at 02:16

## 2017-11-01 NOTE — PLAN OF CARE
Problem: Patient Care Overview  Goal: Discharge Needs Assessment  Outcome: No Change  PRIMARY DIAGNOSIS: Hypokalemia, Abdominal Pain  OUTPATIENT/OBSERVATION GOALS TO BE MET BEFORE DISCHARGE:     1. Pain status: Patient complains of abdominal cramping, but states it is mild and denies intervention  2. Stable vital signs and labs (if performed) at disposition: Yes  3. Tolerating adequate PO diet: Patient tolerating a clear liquid diet, will advance to full for dinner, and regular for AM  4. Successful cholecystectomy or clear follow up plan with General Surgery team if immediate surgery not performed. Possible surgery consult if patient not tolerating diet  5. ADLs back to baseline?  Yes  6. Activity and level of assistance: Ambulating independently.  7. Barriers to discharge noted: replace potassium, tolerate regular diet     Discharge Planner Nurse   Safe discharge environment identified: Yes  Barriers to discharge: Not once medically cleared       Entered by: Jeri Sutherland 10/31/17 4:00pm     Please review provider order for any additional goals.   Nurse to notify provider when observation goals have been met and patient is ready for discharge.     Alert and oriented x4. Patient up independently. VSS. IVF with K+ at 100ml/hr. Will page for Potassium protocol, K+ currently 3.0 at noon, will get a stat recheck before protocol. Patient complains of mild cramping but denies intervention. Patient states he has been on a low carb diet for the past 3-4 months, but for the last three weeks has strictly been fasting with water only. Per orders, will advance diet as tolerated. Patient has tolerated clear liquids and saltines with no problems. If patient doesn't tolerate diet, possible surgery consult. Will continue to monitor.

## 2017-11-01 NOTE — PLAN OF CARE
Problem: Patient Care Overview  Goal: Plan of Care/Patient Progress Review  Outcome: Adequate for Discharge Date Met:  11/01/17  OBSERVATION patient END time: 1045     Patient's After Visit Summary was reviewed with patient.   Patient verbalized understanding of After Visit Summary, recommended follow up and was given an opportunity to ask questions.   Discharge medications sent home with patient/family: Not applicable   Discharged by self.

## 2017-11-01 NOTE — DISCHARGE SUMMARY
"[facility] Outpatient / Observation Unit  Discharge Summary        Yoel Castano MRN# [id number]   YOB: 1960 Age: 56 year old     Date of Admission:  10/31/2017  Date of Discharge:  11/1/2017  Admitting Physician:  Villa Little MD  Discharge Physician: Reina Ruiz PA-C  Discharging Service: Hospitalist      Primary Provider: No Ref-Primary, Physician  Primary Care Physician Phone Number: None         Primary Discharge Diagnoses:    Yoel Castano was admitted on 10/31/2017 for symptoms concerning for biliary colic.     1. Intractable biliary colic - symptoms have improved, able to tolerate PO intake. Sx occurred in the context of recent 3 week fast, preceded by 2 months of \"ketone diet\" (no sugar diet). Pt lost 35-40 lbs in 3 months. Has previously fasted without similar problems, never preceded fast with restrictive diet. Does recall intermittent similar sx in the past, not as severe. Gallstone likely causing intermittent pain but pt would like to manage with diet modifications if able. Recommend advancing diet as tolerated per below. If sx reoccur, follow up with general surgery  2. Constipation - had very small stool 5 days ago, nothing since. Did have 1 or 2 large BM during fast, which is unusual for him during a fast. He is worried about stooling. He may have mild ileus vs constipation from recent alterations to diet. Recommend full liquid diet until bowels are moving. If no BM for 1-2 days, start Miralax BID.  3. Hypokalemia - due to recent fast. Replaced per protocol and resolved.           Secondary Discharge Diagnoses:   History reviewed. No pertinent past medical history.             Code Status:      Full Code        Brief Hospital Summary:        Reason for your hospital stay       Epigastric abdominal pain and mild nausea and vomiting. Pain has resolved   and you tolerated a regular diet. Potassium was low on admission, this was   replaced and now within normal limits. You have " not had a BM for several   days and are beginning to feel bloated. Recommend full liquid diet until   bowels start to move. If no movement in 1-2 days, recommend Miralax.                    Please refer to initial admission history and physical for further details.   Briefly, Yoel Castano was admitted on 10/31/2017 with symptoms concerning biliary colic.   Initial work up in the ED did not reveal evidence of sepsis to require inpatient hospitalization. Potassium was noted to be low. Pt was registered to the Observation Unit for pain control and supportive measures.     Pt was resuscitated with IVF, and anti-emetics. Laboratory and imaging results indicated: outside RUQ US showed gallstone with some thickening to the gallbladder wall, concerning for early acute cholecystitis. He declined speaking with a surgeon at this time and wanted to manage symptoms conservatively. Symptoms did resolve and diet was advanced as tolerated. At the time of discharge, pt's pain was controlled and was able to tolerate PO intake.  Medications were reviewed. Pt is instructed to follow up as below.               Significant Lab During Hospitalization:        Recent Labs  Lab 11/01/17  0643 10/31/17  1145   WBC 7.5 8.5   HGB 12.1* 13.7   HCT 37.3* 39.5*   MCV 89 86    172       Recent Labs  Lab 11/01/17  0643 10/31/17  2230 10/31/17  1145     --  139   POTASSIUM 3.6 3.1* 3.0*   CHLORIDE 105  --  99   CO2 30  --  33*   ANIONGAP 5  --  7   *  --  99   BUN 6*  --  6*   CR 0.97  --  1.13   GFRESTIMATED 80  --  67   GFRESTBLACK >90  --  81   TALAT 8.0*  --  9.0   MAG  --   --  1.9   PROTTOTAL  --   --  7.2   ALBUMIN  --   --  3.5   BILITOTAL  --   --  0.7   ALKPHOS  --   --  63   AST  --   --  62*   ALT  --   --  67       Recent Labs  Lab 10/31/17  1145   INR 1.06       Recent Labs  Lab 10/31/17  1145   LIPASE 305       Recent Labs  Lab 10/31/17  1145   TROPI <0.015                Significant Imaging During  "Hospitalization:      No results found for this or any previous visit (from the past 48 hour(s)).           Pending Results:        Unresulted Labs Ordered in the Past 30 Days of this Admission     No orders found for last 61 day(s).              Consultations This Hospital Stay:      No consultations were requested during this admission         Discharge Instructions and Follow-Up:      Follow-up Appointments     Follow-up and recommended labs and tests        Follow up with primary care provider, Physician No Ref-Primary, within 7   days for hospital follow- up.  No follow up labs or test are needed.  Follow up with general surgery if epigastric pain reoccurs or becomes   persistent.   Recommend full liquid diet until bowels begin to move. If no bowel   movement in the next 1-2 days, consider Miralax.                        Discharge Disposition:      Discharged to home         Discharge Medications:      There are no discharge medications for this patient.          Allergies:       No Known Allergies        Condition and Physical on Discharge:      Discharge condition: Stable   Vitals: Blood pressure 119/84, pulse 71, temperature 99.8  F (37.7  C), temperature source Oral, resp. rate 16, height 1.803 m (5' 11\"), weight 80.2 kg (176 lb 14.4 oz), SpO2 98 %.  176 lbs 14.4 oz      GENERAL:  Comfortable.  PSYCH: pleasant, oriented, No acute distress.  HEART:  Normal S1, S2 with no murmur, no pericardial rub, gallops or S3 or S4.  LUNGS:  Clear to auscultation, normal Respiratory effort. No wheezing, rales or ronchi.  ABDOMEN:  Soft, hypoactive bowel sounds.   EXTREMITIES:  No pedal edema, +2 pulses bilateral and equal.  SKIN:  Dry to touch, No rash, wound or ulcerations.  NEUROLOGIC:  Grossly intact    Reina Ruiz PA-C  "

## 2017-11-01 NOTE — PLAN OF CARE
Problem: Patient Care Overview  Goal: Plan of Care/Patient Progress Review  PRIMARY DIAGNOSIS: Hypokalemia, Abdominal Pain  OUTPATIENT/OBSERVATION GOALS TO BE MET BEFORE DISCHARGE:      1. Pain status: Denies pain   2. Stable vital signs and labs (if performed) at disposition: Yes  3. Tolerating adequate PO diet: Yes had soup reheated and tolerated well   4. Successful cholecystectomy or clear follow up plan with General Surgery team if immediate surgery not performed. Possible surgery consult if patient not tolerating diet  5. ADLs back to baseline? Yes  6. Activity and level of assistance: Ambulating independently.  7. Barriers to discharge noted: replace potassium, tolerate regular diet      Discharge Planner Nurse   Safe discharge environment identified: Yes  Barriers to discharge: Not once medically cleared       Entered by: Valeria Del Toro 0100       Please review provider order for any additional goals.   Nurse to notify provider when observation goals have been met and patient is ready for discharge.      Patient denies abd pain. Had fevers on evening- 99.1 now. Plan to replace potassium overnight. K 3.1. Will recheck at 0600.  Moving ind. D5 0.9+20@100 ordered. On tele- SR hr 69. Regular diet- Patient had soup reheated and tolerated fine.

## 2017-11-01 NOTE — PLAN OF CARE
Problem: Patient Care Overview  Goal: Plan of Care/Patient Progress Review  PRIMARY DIAGNOSIS: Hypokalemia, Abdominal Pain  OUTPATIENT/OBSERVATION GOALS TO BE MET BEFORE DISCHARGE:      1. Pain status: Denies pain   2. Stable vital signs and labs (if performed) at disposition: Yes  3. Tolerating adequate PO diet: Yes had soup reheated and tolerated well   4. Successful cholecystectomy or clear follow up plan with General Surgery team if immediate surgery not performed. Possible surgery consult if patient not tolerating diet  5. ADLs back to baseline? Yes  6. Activity and level of assistance: Ambulating independently.  7. Barriers to discharge noted: replace potassium, tolerate regular diet      Discharge Planner Nurse   Safe discharge environment identified: Yes  Barriers to discharge: Not once medically cleared       Entered by: Valeria Del Toro 0100       Please review provider order for any additional goals.   Nurse to notify provider when observation goals have been met and patient is ready for discharge.      Patient denies abd pain. Had fevers on evening- 99.3 now. Plan to replace potassium overnight. K 3.1. Moving ind. D5 0.9+20@100 ordered. On tele- SR hr 69. Regular diet.

## 2017-11-01 NOTE — PLAN OF CARE
Problem: Patient Care Overview  Goal: Plan of Care/Patient Progress Review  PRIMARY DIAGNOSIS: HYPOKALEMIA/ABD PAIN/N/V  OUTPATIENT/OBSERVATION GOALS TO BE MET BEFORE DISCHARGE:  1. ADLs back to baseline: Yes      2. Activity and level of assistance: Ambulating independently.      3. Pain status: reports mild discomfort      4. Return to near baseline physical activity: Yes          Pt A&Ox4, VSS. Pt reports mild discomfort in abd. Denies nausea, tolerating regular diet. IVF infusing. Up ind in room. Will continue to monitor.     Discharge Planner Nurse   Safe discharge environment identified: Yes  Barriers to discharge: Yes       Entered by: Yaz Cheema 11/01/2017 9:00 AM     Please review provider order for any additional goals.   Nurse to notify provider when observation goals have been met and patient is ready for discharge.

## 2017-11-01 NOTE — PLAN OF CARE
Problem: Patient Care Overview  Goal: Discharge Needs Assessment  Outcome: Improving  Problem: Patient Care Overview  Goal: Discharge Needs Assessment  Outcome: No Change  PRIMARY DIAGNOSIS: Hypokalemia, Abdominal Pain  OUTPATIENT/OBSERVATION GOALS TO BE MET BEFORE DISCHARGE:      1. Pain status: Patient states his abdominal discomfort is very mild, and continues to deny any intervention.  2. Stable vital signs and labs (if performed) at disposition: Yes  3. Tolerating adequate PO diet: Patient tolerated a clear liquid diet, ordered soup to eat later this evening, and will try something light in the AM  4. Successful cholecystectomy or clear follow up plan with General Surgery team if immediate surgery not performed. Possible surgery consult if patient not tolerating diet  5. ADLs back to baseline? Yes  6. Activity and level of assistance: Ambulating independently.  7. Barriers to discharge noted: replace potassium, tolerate regular diet      Discharge Planner Nurse   Safe discharge environment identified: Yes  Barriers to discharge: Not once medically cleared       Entered by: Jeri Sutherland 10/31/17 8:00pm     Please review provider order for any additional goals.   Nurse to notify provider when observation goals have been met and patient is ready for discharge.      Alert and oriented x4. Patient up independently. Temp of 100.9, will continue to monitor to see if it trends down, patient denies any fever like symptoms and states he feels fine. IVF with K+ at 100ml/hr. Potassium recheck was 3.1, will replace potassium per protocol. Patient complains of mild abdominal discomfort but continues to denies intervention. Patient states he has been on a low carb diet for the past 3-4 months, but for the last three weeks has strictly been fasting with water only. Per orders, will advance diet as tolerated. Patient has tolerated clear liquids and saltines with no problems. If patient doesn't tolerate diet, possible  surgery consult. Will continue to monitor.

## 2017-11-04 ENCOUNTER — APPOINTMENT (OUTPATIENT)
Dept: ULTRASOUND IMAGING | Facility: CLINIC | Age: 57
End: 2017-11-04
Attending: EMERGENCY MEDICINE
Payer: COMMERCIAL

## 2017-11-04 ENCOUNTER — HOSPITAL ENCOUNTER (EMERGENCY)
Facility: CLINIC | Age: 57
Discharge: HOME | End: 2017-11-05
Attending: EMERGENCY MEDICINE | Admitting: EMERGENCY MEDICINE
Payer: COMMERCIAL

## 2017-11-04 DIAGNOSIS — R10.13 ABDOMINAL PAIN, EPIGASTRIC: ICD-10-CM

## 2017-11-04 DIAGNOSIS — K81.9 CHOLECYSTITIS: ICD-10-CM

## 2017-11-04 LAB
ALBUMIN SERPL-MCNC: 2.9 G/DL (ref 3.4–5)
ALP SERPL-CCNC: 325 U/L (ref 40–150)
ALT SERPL W P-5'-P-CCNC: 703 U/L (ref 0–70)
ANION GAP SERPL CALCULATED.3IONS-SCNC: 9 MMOL/L (ref 3–14)
AST SERPL W P-5'-P-CCNC: 398 U/L (ref 0–45)
BASOPHILS # BLD AUTO: 0 10E9/L (ref 0–0.2)
BASOPHILS NFR BLD AUTO: 0.5 %
BILIRUB SERPL-MCNC: 4.4 MG/DL (ref 0.2–1.3)
BUN SERPL-MCNC: 11 MG/DL (ref 7–30)
CALCIUM SERPL-MCNC: 8.8 MG/DL (ref 8.5–10.1)
CHLORIDE SERPL-SCNC: 100 MMOL/L (ref 94–109)
CO2 SERPL-SCNC: 28 MMOL/L (ref 20–32)
CREAT SERPL-MCNC: 1.39 MG/DL (ref 0.66–1.25)
DIFFERENTIAL METHOD BLD: ABNORMAL
EOSINOPHIL # BLD AUTO: 0.1 10E9/L (ref 0–0.7)
EOSINOPHIL NFR BLD AUTO: 1.7 %
ERYTHROCYTE [DISTWIDTH] IN BLOOD BY AUTOMATED COUNT: 12.9 % (ref 10–15)
GFR SERPL CREATININE-BSD FRML MDRD: 53 ML/MIN/1.7M2
GLUCOSE SERPL-MCNC: 96 MG/DL (ref 70–99)
HCT VFR BLD AUTO: 36.1 % (ref 40–53)
HGB BLD-MCNC: 12.1 G/DL (ref 13.3–17.7)
IMM GRANULOCYTES # BLD: 0 10E9/L (ref 0–0.4)
IMM GRANULOCYTES NFR BLD: 0.3 %
LIPASE SERPL-CCNC: 461 U/L (ref 73–393)
LYMPHOCYTES # BLD AUTO: 1.1 10E9/L (ref 0.8–5.3)
LYMPHOCYTES NFR BLD AUTO: 17 %
MCH RBC QN AUTO: 29.4 PG (ref 26.5–33)
MCHC RBC AUTO-ENTMCNC: 33.5 G/DL (ref 31.5–36.5)
MCV RBC AUTO: 88 FL (ref 78–100)
MONOCYTES # BLD AUTO: 0.9 10E9/L (ref 0–1.3)
MONOCYTES NFR BLD AUTO: 12.9 %
NEUTROPHILS # BLD AUTO: 4.5 10E9/L (ref 1.6–8.3)
NEUTROPHILS NFR BLD AUTO: 67.6 %
NRBC # BLD AUTO: 0 10*3/UL
NRBC BLD AUTO-RTO: 0 /100
PLATELET # BLD AUTO: 267 10E9/L (ref 150–450)
POTASSIUM SERPL-SCNC: 3.4 MMOL/L (ref 3.4–5.3)
PROT SERPL-MCNC: 7.5 G/DL (ref 6.8–8.8)
RBC # BLD AUTO: 4.11 10E12/L (ref 4.4–5.9)
SODIUM SERPL-SCNC: 137 MMOL/L (ref 133–144)
TROPONIN I SERPL-MCNC: <0.015 UG/L (ref 0–0.04)
WBC # BLD AUTO: 6.6 10E9/L (ref 4–11)

## 2017-11-04 PROCEDURE — 99285 EMERGENCY DEPT VISIT HI MDM: CPT | Mod: 25

## 2017-11-04 PROCEDURE — 80053 COMPREHEN METABOLIC PANEL: CPT | Performed by: EMERGENCY MEDICINE

## 2017-11-04 PROCEDURE — 83690 ASSAY OF LIPASE: CPT | Performed by: EMERGENCY MEDICINE

## 2017-11-04 PROCEDURE — 96375 TX/PRO/DX INJ NEW DRUG ADDON: CPT

## 2017-11-04 PROCEDURE — 96374 THER/PROPH/DIAG INJ IV PUSH: CPT

## 2017-11-04 PROCEDURE — 84484 ASSAY OF TROPONIN QUANT: CPT | Performed by: EMERGENCY MEDICINE

## 2017-11-04 PROCEDURE — 96361 HYDRATE IV INFUSION ADD-ON: CPT

## 2017-11-04 PROCEDURE — 85025 COMPLETE CBC W/AUTO DIFF WBC: CPT | Performed by: EMERGENCY MEDICINE

## 2017-11-04 PROCEDURE — 25000128 H RX IP 250 OP 636: Performed by: EMERGENCY MEDICINE

## 2017-11-04 PROCEDURE — 76705 ECHO EXAM OF ABDOMEN: CPT

## 2017-11-04 RX ORDER — ONDANSETRON 2 MG/ML
4 INJECTION INTRAMUSCULAR; INTRAVENOUS
Status: COMPLETED | OUTPATIENT
Start: 2017-11-04 | End: 2017-11-04

## 2017-11-04 RX ORDER — SODIUM CHLORIDE 9 MG/ML
1000 INJECTION, SOLUTION INTRAVENOUS CONTINUOUS
Status: DISCONTINUED | OUTPATIENT
Start: 2017-11-04 | End: 2017-11-05 | Stop reason: HOSPADM

## 2017-11-04 RX ORDER — HYDROMORPHONE HYDROCHLORIDE 1 MG/ML
0.5 INJECTION, SOLUTION INTRAMUSCULAR; INTRAVENOUS; SUBCUTANEOUS
Status: DISCONTINUED | OUTPATIENT
Start: 2017-11-04 | End: 2017-11-05 | Stop reason: HOSPADM

## 2017-11-04 RX ORDER — MORPHINE SULFATE 4 MG/ML
4 INJECTION, SOLUTION INTRAMUSCULAR; INTRAVENOUS
Status: DISCONTINUED | OUTPATIENT
Start: 2017-11-04 | End: 2017-11-05 | Stop reason: HOSPADM

## 2017-11-04 RX ORDER — KETOROLAC TROMETHAMINE 30 MG/ML
30 INJECTION, SOLUTION INTRAMUSCULAR; INTRAVENOUS ONCE
Status: COMPLETED | OUTPATIENT
Start: 2017-11-04 | End: 2017-11-04

## 2017-11-04 RX ADMIN — KETOROLAC TROMETHAMINE 30 MG: 30 INJECTION, SOLUTION INTRAMUSCULAR at 22:47

## 2017-11-04 RX ADMIN — ONDANSETRON 4 MG: 2 INJECTION INTRAMUSCULAR; INTRAVENOUS at 22:47

## 2017-11-04 RX ADMIN — SODIUM CHLORIDE 1000 ML: 9 INJECTION, SOLUTION INTRAVENOUS at 22:47

## 2017-11-04 ASSESSMENT — ENCOUNTER SYMPTOMS
FEVER: 0
CHILLS: 0
ABDOMINAL PAIN: 1
DIARRHEA: 0
VOMITING: 1

## 2017-11-04 NOTE — ED AVS SNAPSHOT
[facility] Emergency Department    [address]    [facility] [address]    Phone:  [phone]    Fax:  [phone]                                       Yoel Castano   MRN: [id number]    Department:  [facility] Emergency Department   Date of Visit:  11/4/2017           Patient Information     Date Of Birth          1960        Your diagnoses for this visit were:     Abdominal pain, epigastric     Cholecystitis        You were seen by Gustavo Mix MD and Jassi Pillai MD.      Follow-up Information     Follow up with [facility]. Schedule an appointment as soon as possible for a visit in 2 days.    Contact information:    [address]  [facility] [address] [phone]        Discharge Instructions       Discharge Instructions  Abdominal Pain    Abdominal pain can be caused by many things. Your evaluation today does not show the exact cause for your pain. Your doctor today has decided that it is unlikely your pain is due to a life threatening problem, or a problem requiring surgery or hospital admission. Sometimes those problems cannot be found right away, so it is very important that you follow up as directed.  Sometimes only the changes which occur over time allow the cause of your pain to be found.    Return to the Emergency Department for a recheck in 8-12 hours if your pain continues.  If your pain gets worse, changes in location, or feels different, return to the Emergency Department right away.    ADULTS:  Return to the Emergency Department right away if:      You get an oral temperature above 102oF or as directed by your doctor.    You have blood in your stools (bright red or black, tarry stools).    You keep throwing up or can t drink liquids.    You see blood when you throw up.    You can t have a bowel movement or you can t pass gas.    Your stomach gets bloated or bigger.    Your skin  or the whites of your eyes look yellow.    You faint.    You have bloody, frequent or painful urination.    You have new symptoms or anything that worries you.    CHILDREN:  Return to the Emergency Department right away if your child has any of the above-listed symptoms or the following:      Pushes your hand away or screams/cries when his/her belly is touched.    You notice your child is very fussy or weak.    Your child is very tired and is too tired to eat or drink.    Your child is dehydrated.  Signs of dehydration can be:  o Your infant has had no wet diapers in 4-5 hours.  o Your older child has not passed urine in 6-8 hours.  o Your infant or child starts to have dry mouth and lips, or no saliva or tears.    PREGNANT WOMEN:  Return to the Emergency Department right away if you have any of the above-listed symptoms or the following:      You have bleeding, leaking fluid or passing tissue from the vagina.    You have worse pain or cramping, or pain in your shoulder or back.    You have vomiting that will not stop.    You have painful or bloody urination.    You have a temperature of 100oF or more.    Your baby is not moving as much as usual.    You faint.    You get a bad headache with or without eye problems and abdominal pain.    You have a convulsion or seizure.    You have unusual discharge from your vagina and abdominal pain.    Abdominal pain is pretty common during pregnancy.  Your pain may or may not be related to your pregnancy. You should follow-up closely with your OB doctor so they can evaluate you and your baby.  Until you follow-up with your regular doctor, do the following:       Avoid sex and do not put anything in your vagina.    Drink clear fluids.    Only take medications approved by your doctor.    MORE INFORMATION:    Appendicitis:  A possible cause of abdominal pain in any person who still has their appendix is acute appendicitis. Appendicitis is often hard to diagnose.  Testing does not  "always rule out early appendicitis or other causes of abdominal pain. Close follow-up with your doctor and re-evaluations may be needed to figure out the reason for your abdominal pain.    Follow-up:  It is very important that you make an appointment with your clinic and go to the appointment.  If you do not follow-up with your primary doctor, it may result in missing an important development which could result in permanent injury or disability and/or lasting pain.  If there is any problem keeping your appointment, call your doctor or return to the Emergency Department.    Medications:  Take your medications as directed by your doctor today.  Before using over-the-counter medications, ask your doctor and make sure to take the medications as directed.  If you have any questions about medications, ask your doctor.    Diet:  Resume your normal diet as much as possible, but do not eat fried, fatty or spicy foods while you have pain.  Do not drink alcohol or have caffeine.  Do not smoke tobacco.    Probiotics: If you have been given an antibiotic, you may want to also take a probiotic pill or eat yogurt with live cultures. Probiotics have \"good bacteria\" to help your intestines stay healthy. Studies have shown that probiotics help prevent diarrhea and other intestine problems (including C. diff infection) when you take antibiotics. You can buy these without a prescription in the pharmacy section of the store.     If you were given a prescription for medicine here today, be sure to read all of the information (including the package insert) that comes with your prescription.  This will include important information about the medicine, its side effects, and any warnings that you need to know about.  The pharmacist who fills the prescription can provide more information and answer questions you may have about the medicine.  If you have questions or concerns that the pharmacist cannot address, please call or return to the " Emergency Department.         Opioid Medication Information    Pain medications are among the most commonly prescribed medicines, so we are including this information for all our patients. If you did not receive pain medication or get a prescription for pain medicine, you can ignore it.     You may have been given a prescription for an opioid (narcotic) pain medicine and/or have received a pain medicine while here in the Emergency Department. These medicines can make you drowsy or impaired. You must not drive, operate dangerous equipment, or engage in any other dangerous activities while taking these medications. If you drive while taking these medications, you could be arrested for DUI, or driving under the influence. Do not drink any alcohol while you are taking these medications.     Opioid pain medications can cause addiction. If you have a history of chemical dependency of any type, you are at a higher risk of becoming addicted to pain medications.  Only take these prescribed medications to treat your pain when all other options have been tried. Take it for as short a time and as few doses as possible. Store your pain pills in a secure place, as they are frequently stolen and provide a dangerous opportunity for children or visitors in your house to start abusing these powerful medications. We will not replace any lost or stolen medicine.  As soon as your pain is better, you should flush all your remaining medication.     Many prescription pain medications contain Tylenol  (acetaminophen), including Vicodin , Tylenol #3 , Norco , Lortab , and Percocet .  You should not take any extra pills of Tylenol  if you are using these prescription medications or you can get very sick.  Do not ever take more than 3000 mg of acetaminophen in any 24 hour period.    All opioids tend to cause constipation. Drink plenty of water and eat foods that have a lot of fiber, such as fruits, vegetables, prune juice, apple juice and high  fiber cereal.  Take a laxative if you don t move your bowels at least every other day. Miralax , Milk of Magnesia, Colace , or Senna  can be used to keep you regular.      Remember that you can always come back to the Emergency Department if you are not able to see your regular doctor in the amount of time listed above, if you get any new symptoms, or if there is anything that worries you.      You need to follow up with surgeon to have your gallbladder out in 1 week    24 Hour Appointment Hotline       To make an appointment at any [facility], call [phone] ([phone]). If you don't have a family doctor or clinic, we will help you find one. [facility] clinics are conveniently located to serve the needs of you and your family.             Review of your medicines      START taking        Dose / Directions Last dose taken    amoxicillin-clavulanate 875-125 MG per tablet   Commonly known as:  AUGMENTIN   Dose:  1 tablet   Quantity:  28 tablet        Take 1 tablet by mouth 2 times daily   Refills:  0        HYDROcodone-acetaminophen 5-325 MG per tablet   Commonly known as:  NORCO   Dose:  1-2 tablet   Quantity:  15 tablet        Take 1-2 tablets by mouth every 4 hours as needed for moderate to severe pain   Refills:  0                Prescriptions were sent or printed at these locations (2 Prescriptions)                   Other Prescriptions                Printed at Department/Unit printer (2 of 2)         HYDROcodone-acetaminophen (NORCO) 5-325 MG per tablet               amoxicillin-clavulanate (AUGMENTIN) 875-125 MG per tablet                Procedures and tests performed during your visit     CBC + differential    Comprehensive metabolic panel    Lipase    Peripheral IV catheter    Peripheral IV: Standard    Troponin I    US Abdomen Limited      Orders Needing Specimen Collection     None      Pending Results     No orders found for last 3 day(s).            Pending Culture Results     No orders  found for last 3 day(s).            Pending Results Instructions     If you had any lab results that were not finalized at the time of your Discharge, you can call the ED Lab Result RN at [phone]. You will be contacted by this team for any positive Lab results or changes in treatment. The nurses are available 7 days a week from 10A to 6:30P.  You can leave a message 24 hours per day and they will return your call.        Test Results From Your Hospital Stay        11/4/2017 10:37 PM      Component Results     Component Value Ref Range & Units Status    WBC 6.6 4.0 - 11.0 10e9/L Final    RBC Count 4.11 (L) 4.4 - 5.9 10e12/L Final    Hemoglobin 12.1 (L) 13.3 - 17.7 g/dL Final    Hematocrit 36.1 (L) 40.0 - 53.0 % Final    MCV 88 78 - 100 fl Final    MCH 29.4 26.5 - 33.0 pg Final    MCHC 33.5 31.5 - 36.5 g/dL Final    RDW 12.9 10.0 - 15.0 % Final    Platelet Count 267 150 - 450 10e9/L Final    Diff Method Automated Method  Final    % Neutrophils 67.6 % Final    % Lymphocytes 17.0 % Final    % Monocytes 12.9 % Final    % Eosinophils 1.7 % Final    % Basophils 0.5 % Final    % Immature Granulocytes 0.3 % Final    Nucleated RBCs 0 0 /100 Final    Absolute Neutrophil 4.5 1.6 - 8.3 10e9/L Final    Absolute Lymphocytes 1.1 0.8 - 5.3 10e9/L Final    Absolute Monocytes 0.9 0.0 - 1.3 10e9/L Final    Absolute Eosinophils 0.1 0.0 - 0.7 10e9/L Final    Absolute Basophils 0.0 0.0 - 0.2 10e9/L Final    Abs Immature Granulocytes 0.0 0 - 0.4 10e9/L Final    Absolute Nucleated RBC 0.0  Final         11/4/2017 10:58 PM      Component Results     Component Value Ref Range & Units Status    Sodium 137 133 - 144 mmol/L Final    Potassium 3.4 3.4 - 5.3 mmol/L Final    Chloride 100 94 - 109 mmol/L Final    Carbon Dioxide 28 20 - 32 mmol/L Final    Anion Gap 9 3 - 14 mmol/L Final    Glucose 96 70 - 99 mg/dL Final    Urea Nitrogen 11 7 - 30 mg/dL Final    Creatinine 1.39 (H) 0.66 - 1.25 mg/dL Final    GFR Estimate 53 (L) >60 mL/min/1.7m2  Final    Non  GFR Calc    GFR Estimate If Black 64 >60 mL/min/1.7m2 Final    African American GFR Calc    Calcium 8.8 8.5 - 10.1 mg/dL Final    Bilirubin Total 4.4 (H) 0.2 - 1.3 mg/dL Final    Albumin 2.9 (L) 3.4 - 5.0 g/dL Final    Protein Total 7.5 6.8 - 8.8 g/dL Final    Alkaline Phosphatase 325 (H) 40 - 150 U/L Final     (HH) 0 - 70 U/L Final    Critical Value called to and read back by  DILIA OAKES (RHERA) ON 11/04/2017 AT 22:57 BY DNT       (H) 0 - 45 U/L Final         11/4/2017 10:55 PM      Component Results     Component Value Ref Range & Units Status    Lipase 461 (H) 73 - 393 U/L Final         11/4/2017 11:49 PM      Narrative     US ABDOMEN LIMITED 11/4/2017 11:42 PM    HISTORY: Right upper quadrant pain.    COMPARISON: None.    FINDINGS:    Gallbladder: Gallbladder is distended with stones and sludge. The  gallbladder wall is thickened measuring up to 0.5 cm. There is focal  tenderness over the gallbladder during the exam.    Bile ducts:  CHD is dilated up to 0.8 cm.      Liver:  Coarse echogenicity. Small cyst in the left lobe measuring 1.7  cm. Mild intrahepatic biliary ductal dilatation.    Pancreas:  Normal where visualized.    Right kidney:  Normal size and echogenicity.    Proximal aorta and IVC are unremarkable.        Impression     IMPRESSION:  Gallbladder is filled with stones and sludge. The wall is  thickened and the common duct is dilated. The findings suggest  cholecystitis.    PAULO MCDANIEL MD         11/4/2017 10:55 PM      Component Results     Component Value Ref Range & Units Status    Troponin I ES <0.015 0.000 - 0.045 ug/L Final    The 99th percentile for upper reference range is 0.045 ug/L.  Troponin values   in the range of 0.045 - 0.120 ug/L may be associated with risks of adverse   clinical events.                  Clinical Quality Measure: Blood Pressure Screening     Your blood pressure was checked while you were in the emergency department  "today. The last reading we obtained was  BP: 116/80 . Please read the guidelines below about what these numbers mean and what you should do about them.  If your systolic blood pressure (the top number) is less than 120 and your diastolic blood pressure (the bottom number) is less than 80, then your blood pressure is normal. There is nothing more that you need to do about it.  If your systolic blood pressure (the top number) is 120-139 or your diastolic blood pressure (the bottom number) is 80-89, your blood pressure may be higher than it should be. You should have your blood pressure rechecked within a year by a primary care provider.  If your systolic blood pressure (the top number) is 140 or greater or your diastolic blood pressure (the bottom number) is 90 or greater, you may have high blood pressure. High blood pressure is treatable, but if left untreated over time it can put you at risk for heart attack, stroke, or kidney failure. You should have your blood pressure rechecked by a primary care provider within the next 4 weeks.  If your provider in the emergency department today gave you specific instructions to follow-up with your doctor or provider even sooner than that, you should follow that instruction and not wait for up to 4 weeks for your follow-up visit.        Thank you for choosing [facility]       Thank you for choosing [facility] for your care. Our goal is always to provide you with excellent care. Hearing back from our patients is one way we can continue to improve our services. Please take a few minutes to complete the written survey that you may receive in the mail after you visit with us. Thank you!        [employer] Information     [employer] lets you send messages to your doctor, view your test results, renew your prescriptions, schedule appointments and more. To sign up, go to [email or web address] . Click on \"Log in\" on the left side of the screen, which will take you to the Welcome page. Then " "click on \"Sign up Now\" on the right side of the page.     You will be asked to enter the access code listed below, as well as some personal information. Please follow the directions to create your username and password.     Your access code is: [email or web address]  Expires: 2018 10:34 AM     Your access code will  in 90 days. If you need help or a new code, please call your [facility] clinic or [phone].        Care EveryWhere ID     This is your Care EveryWhere ID. This could be used by other organizations to access your [facility] medical records  [id number]        Equal Access to Services     [facility] : Tamica Clark, ferdinand gamino, reji waters, marta ibrahim. So [facility] [place] [phone].    ATENCIÓN: Si habla español, tiene a monreal disposición servicios gratuitos de asistencia lingüística. Llame al [phone].    We comply with applicable federal civil rights laws and [place] laws. We do not discriminate on the basis of race, color, national origin, age, disability, sex, sexual orientation, or gender identity.            After Visit Summary       This is your record. Keep this with you and show to your community pharmacist(s) and doctor(s) at your next visit.                  "

## 2017-11-04 NOTE — ED AVS SNAPSHOT
[facility] Emergency Department    [address]    [facility] [address]    Phone:  [phone]    Fax:  [phone]                                       Yoel Castano   MRN: [id number]    Department:  [facility] Emergency Department   Date of Visit:  11/4/2017           After Visit Summary Signature Page     I have received my discharge instructions, and my questions have been answered. I have discussed any challenges I see with this plan with the nurse or doctor.    ..........................................................................................................................................  Patient/Patient Representative Signature      ..........................................................................................................................................  Patient Representative Print Name and Relationship to Patient    ..................................................               ................................................  Date                                            Time    ..........................................................................................................................................  Reviewed by Signature/Title    ...................................................              ..............................................  Date                                                            Time

## 2017-11-05 VITALS
WEIGHT: 175 LBS | SYSTOLIC BLOOD PRESSURE: 110 MMHG | DIASTOLIC BLOOD PRESSURE: 73 MMHG | HEART RATE: 94 BPM | TEMPERATURE: 98.6 F | RESPIRATION RATE: 18 BRPM | HEIGHT: 71 IN | BODY MASS INDEX: 24.5 KG/M2 | OXYGEN SATURATION: 95 %

## 2017-11-05 RX ORDER — HYDROCODONE BITARTRATE AND ACETAMINOPHEN 5; 325 MG/1; MG/1
1-2 TABLET ORAL EVERY 4 HOURS PRN
Qty: 15 TABLET | Refills: 0 | Status: SHIPPED | OUTPATIENT
Start: 2017-11-05

## 2017-11-05 NOTE — DISCHARGE INSTRUCTIONS
Discharge Instructions  Abdominal Pain    Abdominal pain can be caused by many things. Your evaluation today does not show the exact cause for your pain. Your doctor today has decided that it is unlikely your pain is due to a life threatening problem, or a problem requiring surgery or hospital admission. Sometimes those problems cannot be found right away, so it is very important that you follow up as directed.  Sometimes only the changes which occur over time allow the cause of your pain to be found.    Return to the Emergency Department for a recheck in 8-12 hours if your pain continues.  If your pain gets worse, changes in location, or feels different, return to the Emergency Department right away.    ADULTS:  Return to the Emergency Department right away if:      You get an oral temperature above 102oF or as directed by your doctor.    You have blood in your stools (bright red or black, tarry stools).    You keep throwing up or can t drink liquids.    You see blood when you throw up.    You can t have a bowel movement or you can t pass gas.    Your stomach gets bloated or bigger.    Your skin or the whites of your eyes look yellow.    You faint.    You have bloody, frequent or painful urination.    You have new symptoms or anything that worries you.    CHILDREN:  Return to the Emergency Department right away if your child has any of the above-listed symptoms or the following:      Pushes your hand away or screams/cries when his/her belly is touched.    You notice your child is very fussy or weak.    Your child is very tired and is too tired to eat or drink.    Your child is dehydrated.  Signs of dehydration can be:  o Your infant has had no wet diapers in 4-5 hours.  o Your older child has not passed urine in 6-8 hours.  o Your infant or child starts to have dry mouth and lips, or no saliva or tears.    PREGNANT WOMEN:  Return to the Emergency Department right away if you have any of the above-listed symptoms or  the following:      You have bleeding, leaking fluid or passing tissue from the vagina.    You have worse pain or cramping, or pain in your shoulder or back.    You have vomiting that will not stop.    You have painful or bloody urination.    You have a temperature of 100oF or more.    Your baby is not moving as much as usual.    You faint.    You get a bad headache with or without eye problems and abdominal pain.    You have a convulsion or seizure.    You have unusual discharge from your vagina and abdominal pain.    Abdominal pain is pretty common during pregnancy.  Your pain may or may not be related to your pregnancy. You should follow-up closely with your OB doctor so they can evaluate you and your baby.  Until you follow-up with your regular doctor, do the following:       Avoid sex and do not put anything in your vagina.    Drink clear fluids.    Only take medications approved by your doctor.    MORE INFORMATION:    Appendicitis:  A possible cause of abdominal pain in any person who still has their appendix is acute appendicitis. Appendicitis is often hard to diagnose.  Testing does not always rule out early appendicitis or other causes of abdominal pain. Close follow-up with your doctor and re-evaluations may be needed to figure out the reason for your abdominal pain.    Follow-up:  It is very important that you make an appointment with your clinic and go to the appointment.  If you do not follow-up with your primary doctor, it may result in missing an important development which could result in permanent injury or disability and/or lasting pain.  If there is any problem keeping your appointment, call your doctor or return to the Emergency Department.    Medications:  Take your medications as directed by your doctor today.  Before using over-the-counter medications, ask your doctor and make sure to take the medications as directed.  If you have any questions about medications, ask your doctor.    Diet:   "Resume your normal diet as much as possible, but do not eat fried, fatty or spicy foods while you have pain.  Do not drink alcohol or have caffeine.  Do not smoke tobacco.    Probiotics: If you have been given an antibiotic, you may want to also take a probiotic pill or eat yogurt with live cultures. Probiotics have \"good bacteria\" to help your intestines stay healthy. Studies have shown that probiotics help prevent diarrhea and other intestine problems (including C. diff infection) when you take antibiotics. You can buy these without a prescription in the pharmacy section of the store.     If you were given a prescription for medicine here today, be sure to read all of the information (including the package insert) that comes with your prescription.  This will include important information about the medicine, its side effects, and any warnings that you need to know about.  The pharmacist who fills the prescription can provide more information and answer questions you may have about the medicine.  If you have questions or concerns that the pharmacist cannot address, please call or return to the Emergency Department.         Opioid Medication Information    Pain medications are among the most commonly prescribed medicines, so we are including this information for all our patients. If you did not receive pain medication or get a prescription for pain medicine, you can ignore it.     You may have been given a prescription for an opioid (narcotic) pain medicine and/or have received a pain medicine while here in the Emergency Department. These medicines can make you drowsy or impaired. You must not drive, operate dangerous equipment, or engage in any other dangerous activities while taking these medications. If you drive while taking these medications, you could be arrested for DUI, or driving under the influence. Do not drink any alcohol while you are taking these medications.     Opioid pain medications can cause " addiction. If you have a history of chemical dependency of any type, you are at a higher risk of becoming addicted to pain medications.  Only take these prescribed medications to treat your pain when all other options have been tried. Take it for as short a time and as few doses as possible. Store your pain pills in a secure place, as they are frequently stolen and provide a dangerous opportunity for children or visitors in your house to start abusing these powerful medications. We will not replace any lost or stolen medicine.  As soon as your pain is better, you should flush all your remaining medication.     Many prescription pain medications contain Tylenol  (acetaminophen), including Vicodin , Tylenol #3 , Norco , Lortab , and Percocet .  You should not take any extra pills of Tylenol  if you are using these prescription medications or you can get very sick.  Do not ever take more than 3000 mg of acetaminophen in any 24 hour period.    All opioids tend to cause constipation. Drink plenty of water and eat foods that have a lot of fiber, such as fruits, vegetables, prune juice, apple juice and high fiber cereal.  Take a laxative if you don t move your bowels at least every other day. Miralax , Milk of Magnesia, Colace , or Senna  can be used to keep you regular.      Remember that you can always come back to the Emergency Department if you are not able to see your regular doctor in the amount of time listed above, if you get any new symptoms, or if there is anything that worries you.      You need to follow up with surgeon to have your gallbladder out in 1 week

## 2017-11-05 NOTE — ED PROVIDER NOTES
"  History     Chief Complaint:  Abdominal Pain    HPI   Yoel Castano is a 56 year old male who presents to the emergency department today for evaluation of abdominal pain. The patient had abdominal pain one week ago and had an ultrasound on 10/30 showing gallstones. On 10/31, the patient was sent here due to hypokalemia and was admitted to the hospital on 10/31 for this and symptoms concerning for biliary colic. He was then discharged on 11/1 with instructions for surgery consultation if symptoms recurred.  Here, the patient reports several episodes of \"horrendous\" intermittent epigastric pain since last night which is consistent with the pain requiring the ultrasound. He notes that he was \"incapacitated due to pain last night\" so he called EMS this evening in case worsening pain, though he is feeling fine here aside from some abdominal pain with deep breathing. The patient also reports some vomiting today, but no fever, chills, or diarrhea.    Allergies:  No Known Drug Allergies    Medications:    The patient is currently on no regular medications.      Past Medical History:    History reviewed. No pertinent past medical history.    Past Surgical History:     surgery    Family History:    History reviewed. No pertinent family history.     Social History:  The patient was accompanied to the ED by EMS.  Smoking Status: Former smoker  Alcohol Use: No  Marital Status:   [2]     Review of Systems   Constitutional: Negative for chills and fever.   Gastrointestinal: Positive for abdominal pain and vomiting. Negative for diarrhea.   All other systems reviewed and are negative.    Physical Exam   Vitals:  /80  Pulse 94  Temp 98.6  F (37  C) (Oral)  Resp 18  Ht 1.803 m (5' 11\")  Wt 79.4 kg (175 lb)  SpO2 96%  BMI 24.41 kg/m2    Physical Exam  General: The patient is alert, in no respiratory distress.  HENT: Mucous membranes moist.  Cardiovascular: Regular rate and rhythm. Good pulses in all four " extremities. Normal capillary refill and skin turgor.   Respiratory: Lungs are clear. No nasal flaring. No retractions. No wheezing, no crackles.  Gastrointestinal: Hesitancy with moving his gown. Mild diffuse abdominal tenderness. Abdomen soft. No guarding, no rebound. No palpable hernias.   Musculoskeletal: No gross deformity.   Skin: No rashes or petechiae.   Neurologic: The patient is alert and oriented x3. GCS 15. No testable cranial nerve deficit. Follows commands with clear and appropriate speech. Gives appropriate answers. Good strength in all extremities. No gross neurologic deficit. Gross sensation intact. Pupils are round and reactive. No meningismus.   Lymphatic: No cervical adenopathy. No lower extremity swelling.  Psychiatric: The patient is non-tearful.    Emergency Department Course     Imaging:  Radiology findings were communicated with the patient who voiced understanding of the findings.    US Abdomen Limited  Gallbladder is filled with stones and sludge. The wall is  thickened and the common duct is dilated. The findings suggest  Cholecystitis.  Reading per radiology    Laboratory:  Laboratory findings were communicated with the patient who voiced understanding of the findings.    CBC: HGB 12.1 (L) o/w WNL. (WBC 6.6, )   CMP: Creatinine 1.39 (H), GFR 53 (L), Bilirubin Total 4.4 (H), Albumin 2.9 (L), Alkphos 325 (H),  (HH),  (H) o/w WNL  Lipase: 461 (H)  Troponin (Collected 2145): <0.015    Interventions:  2247 ns 1 L IV  2247 Zofran 4 mg IV  2247 Toradol 30 mg IV     Emergency Department Course:  Nursing notes and vitals reviewed.  I performed an exam of the patient as documented above.   IV was inserted and blood was drawn for laboratory testing, results above.  The patient was sent for a US Abdomen Limited while in the emergency department, results above.   0057: I spoke with Dr. Jaime of the gastroenterology service from [facility] regarding patient's presentation, findings,  and plan of care. She said that the patient would need to be started on antibiotics and have his gallbladder removed, but does not necessarily require admission at this time.  0100: The patient was rechecked and was updated on the results of laboratory and imaging studies and plan of care. He had no pain and agreed with the plan for discharge.  I discussed the treatment plan with the patient. He expressed understanding of this plan and consented to discharge. He will be discharged home with instructions for care and follow up. In addition, the patient will return to the emergency department if their symptoms worsen, if new symptoms arise or if there is any concern.  All questions were answered.  I personally reviewed the laboratory and imaging results with the patient and answered all related questions prior to discharge.    Impression & Plan      Medical Decision Making:  The patient has been recently discharged on the first with cholecystitis and known gallstones. He refused to have his gallbladder taken out. He reports he has had several episodes of pain. I did discuss and stress with him that he would need to have his gallbladder taken out to avoid severe complications. I discussed the case with GI on call who agreed with this assessment. We discussed with his cholecystitis whether he required admission, but we both felt he would do well as an outpatient as long as he takes his antibiotics and follows up. I stressed this to him. He is otherwise stable. He said his plan is to go to [place] to have his gallbladder removed. He was discharged in good condition.    Diagnosis:    ICD-10-CM    1. Abdominal pain, epigastric R10.13    2. Cholecystitis K81.9      Disposition:   Home    Discharge Medications:  New Prescriptions    AMOXICILLIN-CLAVULANATE (AUGMENTIN) 875-125 MG PER TABLET    Take 1 tablet by mouth 2 times daily    HYDROCODONE-ACETAMINOPHEN (NORCO) 5-325 MG PER TABLET    Take 1-2 tablets by mouth every 4  hours as needed for moderate to severe pain     Scribe Disclosure:  I, Patricio Anderson, am serving as a scribe at 10:00 PM on 11/4/2017 to document services personally performed by Jassi Pillia MD, based on my observations and the provider's statements to me.    11/4/2017   [facility] EMERGENCY DEPARTMENT       Jassi Pillai MD  11/07/17 0007

## 2017-11-05 NOTE — ED NOTES
Pt here for epigastric pain started last night. Diagnose with gallstone one week ago. ABCs intact, AA&Ox4, gcs 15.

## 2017-11-05 NOTE — ED NOTES
Bed: ED15  Expected date: 11/4/17  Expected time: 9:31 PM  Means of arrival: Ambulance  Comments:  HE - 55 y/o M